# Patient Record
Sex: MALE | Race: OTHER | Employment: OTHER | URBAN - METROPOLITAN AREA
[De-identification: names, ages, dates, MRNs, and addresses within clinical notes are randomized per-mention and may not be internally consistent; named-entity substitution may affect disease eponyms.]

---

## 2022-05-03 ENCOUNTER — NEW PATIENT (OUTPATIENT)
Dept: URBAN - METROPOLITAN AREA CLINIC 32 | Facility: CLINIC | Age: 72
End: 2022-05-03

## 2022-05-03 DIAGNOSIS — H04.123: ICD-10-CM

## 2022-05-03 DIAGNOSIS — H25.093: ICD-10-CM

## 2022-05-03 DIAGNOSIS — H02.88A: ICD-10-CM

## 2022-05-03 DIAGNOSIS — H01.024: ICD-10-CM

## 2022-05-03 DIAGNOSIS — H02.004: ICD-10-CM

## 2022-05-03 PROCEDURE — 92004 COMPRE OPH EXAM NEW PT 1/>: CPT

## 2022-05-03 PROCEDURE — 67820 REVISE EYELASHES: CPT

## 2022-05-03 ASSESSMENT — KERATOMETRY
OD_K2POWER_DIOPTERS: 43.50
OS_AXISANGLE_DEGREES: 115
OS_K1POWER_DIOPTERS: 43.25
OD_K1POWER_DIOPTERS: 43.50
OD_AXISANGLE_DEGREES: 90
OS_AXISANGLE2_DEGREES: 25
OS_K2POWER_DIOPTERS: 43.50
OD_AXISANGLE2_DEGREES: 180

## 2022-05-03 ASSESSMENT — VISUAL ACUITY
OD_SC: J2-2
OS_SC: J2
OS_SC: 20/20-2
OD_SC: 20/20-1
OS_BAT: 20/30
OD_BAT: 20/25

## 2022-05-03 ASSESSMENT — TONOMETRY
OD_IOP_MMHG: 12
OS_IOP_MMHG: 12

## 2022-05-03 NOTE — PATIENT DISCUSSION
Artificial Tears: One drop to both eyes 3-4 times daily. We recommend Refresh lubricating eye drops which can be found at any pharmacy.

## 2022-05-03 NOTE — PROCEDURE NOTE: CLINICAL
PROCEDURE NOTE: Epilation Left Upper Lid. Diagnosis: Entropion and Trichiasis of Eyelid. Anesthesia: Topical. Prior to treatment, the risks/benefits/alternatives were discussed. The patient wished to proceed with procedure. Aberrant lashes removed from HAL using microforcep. Patient tolerated procedure well. There were no complications. Post-op instructions given. Farhan Moreau